# Patient Record
(demographics unavailable — no encounter records)

---

## 2024-12-27 NOTE — PLAN
[[email or web address]] : - [email or web address] - Children and Adults with Attention Deficit Hyperactivity Disorder [[email or web address]] : - [email or web address] - Autism Speaks [IEP or IFSP] : - Copy of most recent Individualized Education Program (IEP) or Family Service Plan (IFSP) [Test reports] : - Reports of most recent psychological, educational, speech/language, PT, OT test results [FreeTextEntry3] : - risperidone 0.5mg daily to target irritability associated with autism spectrum disorder. Effects and side effect profile of the medication were discussed with parent. [de-identified] : - www.includenyc.org for community resources and information about special education supports and related services for children with needs [FreeTextEntry1] : Gordon Platt MD Director, Division of Developmental-Behavioral Pediatrics [facility], [place], [facility] Certified Developmental-Behavioral Pediatrician

## 2024-12-27 NOTE — PHYSICAL EXAM
[Positive mood] : positive mood [de-identified] : intact extraocular movements observed, nonicteric sclera bilaterally [de-identified] : , showed the clinician some of his [place] presents including toys from [employer] and [employer]

## 2024-12-27 NOTE — HISTORY OF PRESENT ILLNESS
[Home] : at home, [unfilled] , at the time of the visit. [Medical Office: ([facility])___] : at the medical office located in  [Mother] : mother [Verbal consent obtained from patient] : the patient, [unfilled] [FreeTextEntry3] : Shavonne Irizarry, parent [FreeTextEntry4] : at  [FreeTextEntry5] : He will be evaluated by CSE because he is not working to his full academic potential. [TWNoteComboBox1] : 1st Grade [FreeTextEntry1] : JOCELYNE BLOUNT is a 6-year-old boy with autism spectrum disorder, mood issues, and insomnia. He has been taking risperidone 0.5mg daily for aggressive behaviors which his mother thinks it helps a little. He has low frustration tolerance and gets upset easily. He is purposefully hitting to hurt others. When he does not get what he wants or others touch his things, he gets angry and aggressive towards that person. He can be hyperactive and impulsive. He takes clonidine 0.1mg tablet- 1 tablet at bedtime which will help him fall asleep, and if he wakes up in the middle of the night, his mother will then give him 1/2 tab of clonidine 0.1mg tablet then he will fall back asleep after an hour or more at times. He remains a picky eater. [de-identified] : intense interest in shiny things, horror movie characters, sharp things (e.g., screwdriver, pencil, etc.) [Major Illness] : no major illness [Major Injury] : no major injury [Surgery] : no surgery [Hospitalizations] : no hospitalizations [New Medications] : no new medication [New Allergies] : no new allergies

## 2025-07-18 NOTE — REVIEW OF SYSTEMS
[Restricted Diet] : restricted diet [Difficulty Falling Asleep] : difficulty falling asleep [Difficulty Remaining Asleep] : difficulty remaining asleep [Moodiness] : moodiness [Irritability] : irritability [Normal] : Musculoskeletal

## 2025-07-18 NOTE — REASON FOR VISIT
[Follow-Up Visit] : a follow-up visit for [ADHD] : ADHD [Autism Spectrum Disorder] : autism spectrum disorder [Behavior Problems] : behavior problems [Response to Medication] : response to medication [Mother] : mother

## 2025-07-18 NOTE — HISTORY OF PRESENT ILLNESS
[No IEP / 504] : No Individualized Education Program or Individualized Accommodation (504) Plan [No Side Effects] : no side effects [Entering in September] : entering in September [Gen Ed: _____] : General Education class [unfilled] [No Major Concerns] : No major concerns

## 2025-07-18 NOTE — PLAN
[Med Options Discussed: _____] : - Medication options discussed [unfilled] [Continue present medication regimen _____] : - Continue present medication regimen [unfilled] [CSE Evaluation] : - Referred to the Committee on Special Education for complete evaluation including intelligence, educational, and speech and language evaluations [Monitor Attention] : - [unfilled]'s attention skills will need to continue to be monitored [Cessation of screen use before bedtime] : - Ensure cessation of video screen use one hour before bedtime [Limit Screen Time] : - Limit screen time [[email or web address]] : - [email or web address] - Children and Adults with Attention Deficit Hyperactivity Disorder [[email or web address]] : - [email or web address] - Autism Speaks [Follow-up visit (med treatment monitoring): ____] : - Follow-up visit in [unfilled]  to evaluate response to medication and monitoring of medication treatment [Teacher BRS] : - Newly completed teacher behavior rating scale(s) [Test reports] : - Reports of most recent psychological, educational, speech/language, PT, OT test results [Findings (To Date)] : Findings from evaluation (to date) [Clinical Basis] : Clinical basis for current diagnosis and clinical impressions [Co-Morbidities] : Clinical disorders and problem commonly associated with this child's condition (now or in the future) [Prognosis] : Prognosis [Goals / Benefits] : Goals & potential benefits of treatment with medication, as well as the limitations of pharmacotherapy [Alpha-2s] : Potential benefits and limitations of treatment with alpha-2 agonists. Potential adverse events were also reviewed, including dry mouth, constipation, sedation, and change in blood pressure with potential for light-headedness when standing.  [Compliance] : Importance of medication compliance [AE Strategies] : Strategies to reduce side effects from current or proposed medication regimen [Atypicals] : Potential benefits and limitations of treatment with atypical antipsychotics. Potential adverse events were also reviewed, including sedation, abnormal movements, metabolic side effects, weight gain, elevated liver function tests, diabetes, electrolyte disturbance, and decreased blood cell lines. [Behavior Modification] : Behavior modification strategies [Resources] : Other available resources [Family Questions] : Family's questions were addressed